# Patient Record
(demographics unavailable — no encounter records)

---

## 2024-10-16 NOTE — CONSULT LETTER
[FreeTextEntry1] : Dear RUBEN LEMONS ,   I had the pleasure of seeing your patient, MADY STAFFORD, in my office today.  Please see my note below.  Thank you very much for allowing me to participate in the care of this patient. If you have any questions, please do not hesitate to contact me.  Sincerely,   Padmini Low Md EdM  , Pediatric Nephrology  [facility]

## 2024-10-16 NOTE — REASON FOR VISIT
[Initial Evaluation] : an initial evaluation of [FreeTextEntry3] : prenatal consult for pelvic kidney

## 2025-07-25 NOTE — DISCUSSION/SUMMARY
[FreeTextEntry1] : # Health Maintenance - Cervical cancer screening: Pap smear obtained. - Breast cancer screening: Not indicated at this time. - Colon cancer screening: Not indicated at this time. - Osteoporosis screening: Not indicated at this time. - STI screening: Bloodwork ordered  # Early pregnancy, unconfirmed viability  - Bloodwork ordered - RTO in 2 weeks for repeat scan

## 2025-07-25 NOTE — PROCEDURE
[Cervical Pap Smear] : cervical Pap smear [Liquid Base] : liquid base [Tolerated Well] : the patient tolerated the procedure well [No Complications] : there were no complications [Transvaginal OB Sonogram] : Transvaginal OB Sonogram [Yolk Sac] : yolk sac present [FreeTextEntry1] : +GS +YS - fetal pole / cardiac activity

## 2025-07-25 NOTE — HISTORY OF PRESENT ILLNESS
[FreeTextEntry1] : 31-year-old F presents for annual GYN visit with positive home UPT for 7w0d by LMP 6/6/2025. ROS negative.

## 2025-07-25 NOTE — PHYSICAL EXAM
[Chaperoned Physical Exam] : A chaperone was present in the examining room during all aspects of the physical examination. [MA] : MA [FreeTextEntry2] : Dario [Appropriately responsive] : appropriately responsive [Alert] : alert [No Acute Distress] : no acute distress [Regular Rate Rhythm] : regular rate rhythm [Soft] : soft [Non-tender] : non-tender [Non-distended] : non-distended [No HSM] : No HSM [No Lesions] : no lesions [No Mass] : no mass [Oriented x3] : oriented x3 [FreeTextEntry5] : Breathing comfortably on room air [Examination Of The Breasts] : a normal appearance [No Masses] : no breast masses were palpable [Labia Majora] : normal [Labia Minora] : normal [Normal] : normal [Uterine Adnexae] : normal